# Patient Record
Sex: MALE | Race: WHITE | NOT HISPANIC OR LATINO | Employment: FULL TIME
[De-identification: names, ages, dates, MRNs, and addresses within clinical notes are randomized per-mention and may not be internally consistent; named-entity substitution may affect disease eponyms.]

---

## 2019-10-01 ENCOUNTER — HEALTH MAINTENANCE LETTER (OUTPATIENT)
Age: 54
End: 2019-10-01

## 2020-05-08 ENCOUNTER — TRANSFERRED RECORDS (OUTPATIENT)
Dept: HEALTH INFORMATION MANAGEMENT | Facility: CLINIC | Age: 55
End: 2020-05-08

## 2021-01-15 ENCOUNTER — HEALTH MAINTENANCE LETTER (OUTPATIENT)
Age: 56
End: 2021-01-15

## 2021-05-11 ENCOUNTER — TRANSFERRED RECORDS (OUTPATIENT)
Dept: HEALTH INFORMATION MANAGEMENT | Facility: CLINIC | Age: 56
End: 2021-05-11

## 2021-09-04 ENCOUNTER — HEALTH MAINTENANCE LETTER (OUTPATIENT)
Age: 56
End: 2021-09-04

## 2022-01-18 ENCOUNTER — MEDICAL CORRESPONDENCE (OUTPATIENT)
Dept: HEALTH INFORMATION MANAGEMENT | Facility: CLINIC | Age: 57
End: 2022-01-18

## 2022-02-19 ENCOUNTER — HEALTH MAINTENANCE LETTER (OUTPATIENT)
Age: 57
End: 2022-02-19

## 2022-10-16 ENCOUNTER — HEALTH MAINTENANCE LETTER (OUTPATIENT)
Age: 57
End: 2022-10-16

## 2022-12-06 ENCOUNTER — LAB REQUISITION (OUTPATIENT)
Dept: LAB | Facility: CLINIC | Age: 57
End: 2022-12-06
Payer: COMMERCIAL

## 2022-12-06 ENCOUNTER — TRANSFERRED RECORDS (OUTPATIENT)
Dept: HEALTH INFORMATION MANAGEMENT | Facility: CLINIC | Age: 57
End: 2022-12-06

## 2022-12-06 DIAGNOSIS — Z79.899 OTHER LONG TERM (CURRENT) DRUG THERAPY: ICD-10-CM

## 2022-12-06 DIAGNOSIS — L70.0 ACNE VULGARIS: ICD-10-CM

## 2022-12-06 LAB
ALBUMIN SERPL-MCNC: 3.9 G/DL (ref 3.4–5)
ALP SERPL-CCNC: 61 U/L (ref 40–150)
ALT SERPL W P-5'-P-CCNC: 26 U/L (ref 0–70)
ANION GAP SERPL CALCULATED.3IONS-SCNC: 5 MMOL/L (ref 3–14)
AST SERPL W P-5'-P-CCNC: 17 U/L (ref 0–45)
BILIRUB SERPL-MCNC: 0.5 MG/DL (ref 0.2–1.3)
BUN SERPL-MCNC: 18 MG/DL (ref 7–30)
CALCIUM SERPL-MCNC: 9 MG/DL (ref 8.5–10.1)
CHLORIDE BLD-SCNC: 104 MMOL/L (ref 94–109)
CO2 SERPL-SCNC: 26 MMOL/L (ref 20–32)
CREAT SERPL-MCNC: 0.84 MG/DL (ref 0.66–1.25)
ERYTHROCYTE [DISTWIDTH] IN BLOOD BY AUTOMATED COUNT: 12.6 % (ref 10–15)
GFR SERPL CREATININE-BSD FRML MDRD: >90 ML/MIN/1.73M2
GLUCOSE BLD-MCNC: 94 MG/DL (ref 70–99)
HCT VFR BLD AUTO: 46.3 % (ref 40–53)
HGB BLD-MCNC: 15.2 G/DL (ref 13.3–17.7)
MCH RBC QN AUTO: 30.6 PG (ref 26.5–33)
MCHC RBC AUTO-ENTMCNC: 32.8 G/DL (ref 31.5–36.5)
MCV RBC AUTO: 93 FL (ref 78–100)
PLATELET # BLD AUTO: 239 10E3/UL (ref 150–450)
POTASSIUM BLD-SCNC: 4.5 MMOL/L (ref 3.4–5.3)
PROT SERPL-MCNC: 7.4 G/DL (ref 6.8–8.8)
RBC # BLD AUTO: 4.96 10E6/UL (ref 4.4–5.9)
SODIUM SERPL-SCNC: 135 MMOL/L (ref 133–144)
WBC # BLD AUTO: 6.3 10E3/UL (ref 4–11)

## 2022-12-06 PROCEDURE — 85027 COMPLETE CBC AUTOMATED: CPT | Mod: ORL | Performed by: DERMATOLOGY

## 2022-12-06 PROCEDURE — 80053 COMPREHEN METABOLIC PANEL: CPT | Mod: ORL | Performed by: DERMATOLOGY

## 2023-01-04 ENCOUNTER — MEDICAL CORRESPONDENCE (OUTPATIENT)
Dept: HEALTH INFORMATION MANAGEMENT | Facility: CLINIC | Age: 58
End: 2023-01-04

## 2023-01-05 ENCOUNTER — TRANSCRIBE ORDERS (OUTPATIENT)
Dept: OTHER | Age: 58
End: 2023-01-05

## 2023-01-05 DIAGNOSIS — L98.6 OTHER INFILTRATIVE DISORDERS OF THE SKIN AND SUBCUTANEOUS TISSUE: ICD-10-CM

## 2023-01-05 DIAGNOSIS — D48.5 NEOPLASM OF UNCERTAIN BEHAVIOR OF SKIN: Primary | ICD-10-CM

## 2023-02-09 ENCOUNTER — TRANSCRIBE ORDERS (OUTPATIENT)
Dept: OTHER | Age: 58
End: 2023-02-09

## 2023-02-09 DIAGNOSIS — D48.5 NEOPLASM OF UNCERTAIN BEHAVIOR OF SKIN: Primary | ICD-10-CM

## 2023-02-09 DIAGNOSIS — L98.6 OTHER INFILTRATIVE DISORDERS OF THE SKIN AND SUBCUTANEOUS TISSUE: ICD-10-CM

## 2023-02-09 DIAGNOSIS — L98.8 PSEUDOLYMPHOMA: ICD-10-CM

## 2023-02-09 DIAGNOSIS — C85.80 MARGINAL ZONE B-CELL LYMPHOMA (H): ICD-10-CM

## 2023-02-09 DIAGNOSIS — J34.89 NASAL LESION: ICD-10-CM

## 2023-02-10 ENCOUNTER — TELEPHONE (OUTPATIENT)
Dept: DERMATOLOGY | Facility: CLINIC | Age: 58
End: 2023-02-10
Payer: COMMERCIAL

## 2023-02-10 NOTE — TELEPHONE ENCOUNTER
This encounter is being sent to inform the clinic that this patient has a referral from Dr. Magy Bernabe MD for the diagnoses of Neoplasm of uncertain behavior of skin; Other infiltrative disorders of the skin and subcutaneous tissue; Pseudolymphoma; Marginal zone B-cell lymphoma (H); Nasal lesion; Neoplasm of uncertain behavior of skin, Other infiltrative disorders of the skin and subcutaneous tissue, with h/o pseudolymphomia eval/with Dr. Mosqueda found to be neg for marginal zone lymphona B cell. Well controlled on plaquenil. New nasal lesion. and has requested that this patient be seen within Priority: 1-2 Weeks and/or with Priority: 1-2 Weeks.  Based on the availability of our provider(s), we are unable to accommodate this request.      Were all sites offered this patient?  Offer all available sites  Pt referred specifically to Dr Phyllis Vance    Does scheduling algorithm request to schedule next available?  Patient has been scheduled for the first available opening with Dr Vance on 06/13/2023.  We have informed the patient that the clinic will review their referral and reach out if a sooner appointment is medically necessary.      Pt was scheduled on 01/06/23 for 06/13/23 under Auto Immune     Now an Urgent Referral Order was sent over    Referral Order and Records in [facility]    Records also with Referring Clinic :  Dr. Magy Bernabe MD  [address], [address]  [place] [address]  Phone: [phone]  Fax: [phone]    Please review and call Pt to move up his Appt    Thank you very much!

## 2023-03-02 NOTE — TELEPHONE ENCOUNTER
Writer called patient and scheduled with a different provider who is available tomorrow 3.4.23. Patient acknowledged and writer canceled June appointment.    Mere CAREY RN

## 2023-03-02 NOTE — TELEPHONE ENCOUNTER
Dermatology Specialist calling back to ask that the patient be called for a sooner appointment since they sent over a priority referral.

## 2023-03-03 ENCOUNTER — LAB (OUTPATIENT)
Dept: LAB | Facility: CLINIC | Age: 58
End: 2023-03-03
Payer: COMMERCIAL

## 2023-03-03 ENCOUNTER — OFFICE VISIT (OUTPATIENT)
Dept: DERMATOLOGY | Facility: CLINIC | Age: 58
End: 2023-03-03
Payer: COMMERCIAL

## 2023-03-03 ENCOUNTER — PRE VISIT (OUTPATIENT)
Dept: DERMATOLOGY | Facility: CLINIC | Age: 58
End: 2023-03-03

## 2023-03-03 DIAGNOSIS — D48.5 NEOPLASM OF UNCERTAIN BEHAVIOR OF SKIN: ICD-10-CM

## 2023-03-03 DIAGNOSIS — L98.6 OTHER INFILTRATIVE DISORDERS OF THE SKIN AND SUBCUTANEOUS TISSUE: ICD-10-CM

## 2023-03-03 DIAGNOSIS — L98.8 PSEUDOLYMPHOMA: ICD-10-CM

## 2023-03-03 DIAGNOSIS — C85.80 MARGINAL ZONE B-CELL LYMPHOMA (H): ICD-10-CM

## 2023-03-03 DIAGNOSIS — J34.89 NASAL LESION: ICD-10-CM

## 2023-03-03 LAB
ALBUMIN SERPL BCG-MCNC: 4.4 G/DL (ref 3.5–5.2)
ALP SERPL-CCNC: 55 U/L (ref 40–129)
ALT SERPL W P-5'-P-CCNC: 14 U/L (ref 10–50)
ANION GAP SERPL CALCULATED.3IONS-SCNC: 8 MMOL/L (ref 7–15)
AST SERPL W P-5'-P-CCNC: 17 U/L (ref 10–50)
BASOPHILS # BLD AUTO: 0 10E3/UL (ref 0–0.2)
BASOPHILS NFR BLD AUTO: 1 %
BILIRUB SERPL-MCNC: 0.5 MG/DL
BUN SERPL-MCNC: 20.7 MG/DL (ref 8–23)
CALCIUM SERPL-MCNC: 9.5 MG/DL (ref 8.6–10)
CHLORIDE SERPL-SCNC: 102 MMOL/L (ref 98–107)
CREAT SERPL-MCNC: 0.81 MG/DL (ref 0.67–1.17)
DEPRECATED HCO3 PLAS-SCNC: 28 MMOL/L (ref 22–29)
EOSINOPHIL # BLD AUTO: 0.1 10E3/UL (ref 0–0.7)
EOSINOPHIL NFR BLD AUTO: 2 %
ERYTHROCYTE [DISTWIDTH] IN BLOOD BY AUTOMATED COUNT: 12.4 % (ref 10–15)
GFR SERPL CREATININE-BSD FRML MDRD: >90 ML/MIN/1.73M2
GLUCOSE SERPL-MCNC: 95 MG/DL (ref 70–99)
HCT VFR BLD AUTO: 43.2 % (ref 40–53)
HGB BLD-MCNC: 14.6 G/DL (ref 13.3–17.7)
IMM GRANULOCYTES # BLD: 0 10E3/UL
IMM GRANULOCYTES NFR BLD: 0 %
LDH SERPL L TO P-CCNC: 177 U/L (ref 0–250)
LYMPHOCYTES # BLD AUTO: 1.3 10E3/UL (ref 0.8–5.3)
LYMPHOCYTES NFR BLD AUTO: 25 %
MCH RBC QN AUTO: 31.3 PG (ref 26.5–33)
MCHC RBC AUTO-ENTMCNC: 33.8 G/DL (ref 31.5–36.5)
MCV RBC AUTO: 93 FL (ref 78–100)
MONOCYTES # BLD AUTO: 0.4 10E3/UL (ref 0–1.3)
MONOCYTES NFR BLD AUTO: 9 %
NEUTROPHILS # BLD AUTO: 3.3 10E3/UL (ref 1.6–8.3)
NEUTROPHILS NFR BLD AUTO: 63 %
NRBC # BLD AUTO: 0 10E3/UL
NRBC BLD AUTO-RTO: 0 /100
PLATELET # BLD AUTO: 223 10E3/UL (ref 150–450)
POTASSIUM SERPL-SCNC: 4.4 MMOL/L (ref 3.4–5.3)
PROT SERPL-MCNC: 7.1 G/DL (ref 6.4–8.3)
RBC # BLD AUTO: 4.67 10E6/UL (ref 4.4–5.9)
RETICS # AUTO: 0.03 10E6/UL (ref 0.03–0.1)
RETICS/RBC NFR AUTO: 0.6 % (ref 0.5–2)
SODIUM SERPL-SCNC: 138 MMOL/L (ref 136–145)
WBC # BLD AUTO: 5.2 10E3/UL (ref 4–11)

## 2023-03-03 PROCEDURE — 88184 FLOWCYTOMETRY/ TC 1 MARKER: CPT | Performed by: DERMATOLOGY

## 2023-03-03 PROCEDURE — 83615 LACTATE (LD) (LDH) ENZYME: CPT | Performed by: PATHOLOGY

## 2023-03-03 PROCEDURE — 85060 BLOOD SMEAR INTERPRETATION: CPT | Performed by: PATHOLOGY

## 2023-03-03 PROCEDURE — 80053 COMPREHEN METABOLIC PANEL: CPT | Performed by: PATHOLOGY

## 2023-03-03 PROCEDURE — 85025 COMPLETE CBC W/AUTO DIFF WBC: CPT | Performed by: PATHOLOGY

## 2023-03-03 PROCEDURE — 88189 FLOWCYTOMETRY/READ 16 & >: CPT | Mod: GC | Performed by: PATHOLOGY

## 2023-03-03 PROCEDURE — 36415 COLL VENOUS BLD VENIPUNCTURE: CPT | Performed by: PATHOLOGY

## 2023-03-03 PROCEDURE — 85045 AUTOMATED RETICULOCYTE COUNT: CPT | Performed by: PATHOLOGY

## 2023-03-03 PROCEDURE — 99000 SPECIMEN HANDLING OFFICE-LAB: CPT | Performed by: PATHOLOGY

## 2023-03-03 PROCEDURE — 88184 FLOWCYTOMETRY/ TC 1 MARKER: CPT | Performed by: PATHOLOGY

## 2023-03-03 PROCEDURE — 99204 OFFICE O/P NEW MOD 45 MIN: CPT | Mod: GC | Performed by: DERMATOLOGY

## 2023-03-03 RX ORDER — FLUTICASONE PROPIONATE 0.05 MG/G
OINTMENT TOPICAL
COMMUNITY

## 2023-03-03 RX ORDER — TACROLIMUS 1 MG/G
OINTMENT TOPICAL
COMMUNITY
End: 2023-09-15 | Stop reason: ALTCHOICE

## 2023-03-03 RX ORDER — HYDROXYCHLOROQUINE SULFATE 200 MG/1
1 TABLET, FILM COATED ORAL
COMMUNITY
Start: 2023-01-16 | End: 2023-09-15 | Stop reason: ALTCHOICE

## 2023-03-03 ASSESSMENT — PAIN SCALES - GENERAL: PAINLEVEL: NO PAIN (0)

## 2023-03-03 NOTE — NURSING NOTE
Dermatology Rooming Note    Bob Miranda's goals for this visit include:   Chief Complaint   Patient presents with     Derm Problem     Bob is here today for a lesion of concern on the left side of the nose      Jannet CHAVEZ, MILEY

## 2023-03-03 NOTE — LETTER
3/3/2023       RE: Bob Miranda  [address] [place] [address]     Dear Colleague,    Thank you for referring your patient, Bob Miranda, to the [facility] DERMATOLOGY CLINIC [place] [facility]. Please see a copy of my visit note below.     [facility] Dermatology Note  Encounter Date: Mar 3, 2023  Office Visit     Dermatology Problem List:  1. 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 Bcl-2 positivity potentially suggesting low grade marginal zone lymphoma; by review, historically with Bcl-2 positivity (exluding 2012 biopsy) which could suggest chronicity of diagnosis. Thankfulyl, indolent course of disease (which additionally supports a cutaneous limited disease). Prior PET CT negative 2016 (5 years following onset of disease). Holding on further imaging / oncology involvement pending labs and in-house dermatopathology review.  - Labs today: CBC, CMP, peripheral smear, flow cytometry, LDH  - Outside slide request placed and message sent to histology  - Agree with continued hydroxychloroquine use for now; may discontinue pending initial eval  - Due for annual eye exam - enforced need for exam  - Discontinue tacrolimus given potential for flaring cutaneous lymphoma  - Continue prn topical steroid  - Future considerations for recurrences: IL triamcinolone, XRT (pending location)     Procedures Performed:   None    Follow-up: pending path results    Staff and  Resident:     This patient was staffed with Dr. Aguiar.    Lexx Lynch MD  Internal Medicine - Dermatology PGY5    Staff Physician Comments:   I saw and evaluated the patient with the resident and I edited the assessment and plan as documented in the note. I was present for the examination.    Colten Aguiar MD   of Dermatology  Department of Dermatology  [facility]      ____________________________________________    CC: Derm Problem (Bob is here today for a lesion of concern on the left side of the nose )    HPI:  Mr. Bob Miranda is a(n) 57 year old male who presents today as a new patient for CBCL.  Patient notes greater than 12-year history of intermittent nonhealing lesions on the head and neck predominantly.  First lesion on left forehead.  Biopsy showed potential pseudolymphoma.  Has been managed with multiple different modalities including surgical excision, intralesional Kenalog, narrowband UVB, topical steroids, topical calcineurin inhibitors.  Has had 1 lesion on the upper arm, remainder on the forehead, scalp, nasal tip and most recently right nasal ala.      Numerous biopsies have been performed.  Varying reports of lymphoid infiltrates, low-grade B-cell lymphoproliferative disorders, pseudolymphoma.  Completed PET/CT 2016 which was unremarkable.  Appears historically to have Bcl-2 expression (with exception of 2012 biopsy).  Overall, patient has been relatively well controlled on Plaquenil.  Was tapered through early 2022.  Unfortunately had recurrence this fall.  Biopsy at that time performed and potentially suggestive of marginal zone lymphoma.  Patient was referred for further evaluation.      Today, patient confirms the above history.  He denies any B symptoms.  Has been intentionally losing weight over the last several months on weight watchers.  This is a gradual weight loss.  Has never had historic unintentional weight loss.   Denies any fevers, chills, night sweats, adenopathy.  He is overall in good health.  He is currently managing with Protopic and topical steroid as well as Plaquenil.    Labs Reviewed:  CBC, CMP 12/2022 unremarkable  PET CT 2016 - no e/o lymphoma  Histology as below reviewed    Biopsy Hx:  2022 2021 2020 2016 2016 2014 2012      Physical Exam:  GEN: Pleasant male, in NAD  SKIN: Focused examination of head and neck was performed.  -Prior biopsy site and right ala in addition to multiple other scattered biopsy sites are well-healing with atrophic appearing scar, but no evidence of lymphoma.   -No other lesions of concern on areas examined.     Medications:  Current Outpatient Medications   Medication     fluticasone propionate (CUTIVATE) 0.005 % external ointment     hydroxychloroquine (PLAQUENIL) 200 MG tablet     tacrolimus (PROTOPIC) 0.1 % external ointment     No current facility-administered medications for this visit.      Past Medical History:   There is no problem list on file for this patient.    No past medical history on file.    CC Magy Bernabe  [facility]  [address] [facility] [address]  [place] [address] on close of this encounter.

## 2023-03-03 NOTE — PROGRESS NOTES
[facility] Dermatology Note  Encounter Date: Mar 3, 2023  Office Visit     Dermatology Problem List:  1. 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 Bcl-2 positivity potentially suggesting low grade marginal zone lymphoma; by review, historically with Bcl-2 positivity (exluding 2012 biopsy) which could suggest chronicity of diagnosis. Thankfulyl, indolent course of disease (which additionally supports a cutaneous limited disease). Prior PET CT negative 2016 (5 years following onset of disease). Holding on further imaging / oncology involvement pending labs and in-house dermatopathology review.  - Labs today: CBC, CMP, peripheral smear, flow cytometry, LDH  - Outside slide request placed and message sent to histology  - Agree with continued hydroxychloroquine use for now; may discontinue pending initial eval  - Due for annual eye exam - enforced need for exam  - Discontinue tacrolimus given potential for flaring cutaneous lymphoma  - Continue prn topical steroid  - Future considerations for recurrences: IL triamcinolone, XRT (pending location)     Procedures Performed:   None    Follow-up: pending path results    Staff and Resident:     This patient was staffed with Dr. Aguiar.    Lexx Lynch MD  Internal Medicine - Dermatology PGY5    Staff Physician Comments:   I saw and evaluated the patient with the resident and I edited the assessment and plan as documented in the note. I was present for the examination.    Colten Aguiar,  MD   of Dermatology  Department of Dermatology  [facility]      ____________________________________________    CC: Derm Problem (Bob is here today for a lesion of concern on the left side of the nose )    HPI:  Mr. Bob Miranda is a(n) 57 year old male who presents today as a new patient for CBCL.  Patient notes greater than 12-year history of intermittent nonhealing lesions on the head and neck predominantly.  First lesion on left forehead.  Biopsy showed potential pseudolymphoma.  Has been managed with multiple different modalities including surgical excision, intralesional Kenalog, narrowband UVB, topical steroids, topical calcineurin inhibitors.  Has had 1 lesion on the upper arm, remainder on the forehead, scalp, nasal tip and most recently right nasal ala.      Numerous biopsies have been performed.  Varying reports of lymphoid infiltrates, low-grade B-cell lymphoproliferative disorders, pseudolymphoma.  Completed PET/CT 2016 which was unremarkable.  Appears historically to have Bcl-2 expression (with exception of 2012 biopsy).  Overall, patient has been relatively well controlled on Plaquenil.  Was tapered through early 2022.  Unfortunately had recurrence this fall.  Biopsy at that time performed and potentially suggestive of marginal zone lymphoma.  Patient was referred for further evaluation.      Today, patient confirms the above history.  He denies any B symptoms.  Has been intentionally losing weight over the last several months on weight watchers.  This is a gradual weight loss.  Has never had historic unintentional weight loss.  Denies any fevers, chills, night sweats, adenopathy.  He is overall in good health.  He is currently managing with Protopic and topical steroid as well as Plaquenil.    Labs Reviewed:  CBC, CMP 12/2022 unremarkable  PET CT 2016 - no e/o lymphoma  Histology as below reviewed    Biopsy  Hx:  2022 2021 2020 2016 2016 2014 2012      Physical Exam:  GEN: Pleasant male, in NAD  SKIN: Focused examination of head and neck was performed.  -Prior biopsy site and right ala in addition to multiple other scattered biopsy sites are well-healing with atrophic appearing scar, but no evidence of lymphoma.   -No other lesions of concern on areas examined.     Medications:  Current Outpatient Medications   Medication     fluticasone propionate (CUTIVATE) 0.005 % external ointment     hydroxychloroquine (PLAQUENIL) 200 MG tablet     tacrolimus (PROTOPIC) 0.1 % external ointment     No current facility-administered medications for this visit.      Past Medical History:   There is no problem list on file for this patient.    No past medical history on file.    CC Magy Bernabe  [facility]  [address]  [place] [address] on close of this encounter.

## 2023-03-03 NOTE — TELEPHONE ENCOUNTER
FUTURE VISIT INFORMATION      FUTURE VISIT INFORMATION:    Date: 3.3.23    Time: 10:00    Location: [facility]  REFERRAL INFORMATION:    Referring provider:  Srinivasa    Referring providers clinic:  [facility]    Reason for visit/diagnosis  Neoplasm of uncertain behavior of skin     Other infiltrative disorders of the skin and subcutaneous tissue     Pt with h/o pseudolymphoma eval/ with Dr Mosqueda found to be neg for marginal zone lymphoma B cell. Well controlled on plaquenil. New nasal lesion 11-22 bx c/w marginal zone B cell lymphoma.    RECORDS REQUESTED FROM:       Clinic name Comments Records Status Imaging Status   [facility] 12.6.22  Srinivasa Received  Sent to scanning  (Also put copy in Dr. Mueller Med Records tab) Received  Sent to scanning (Also put copy in Dr. Mueller Med Records tab)

## 2023-03-06 LAB
PATH REPORT.COMMENTS IMP SPEC: NORMAL
PATH REPORT.FINAL DX SPEC: NORMAL
PATH REPORT.FINAL DX SPEC: NORMAL
PATH REPORT.MICROSCOPIC SPEC OTHER STN: NORMAL
PATH REPORT.RELEVANT HX SPEC: NORMAL
PATH REPORT.RELEVANT HX SPEC: NORMAL

## 2023-04-01 ENCOUNTER — HEALTH MAINTENANCE LETTER (OUTPATIENT)
Age: 58
End: 2023-04-01

## 2023-09-15 ENCOUNTER — OFFICE VISIT (OUTPATIENT)
Dept: DERMATOLOGY | Facility: CLINIC | Age: 58
End: 2023-09-15
Payer: COMMERCIAL

## 2023-09-15 DIAGNOSIS — C85.80 MARGINAL ZONE B-CELL LYMPHOMA (H): Primary | ICD-10-CM

## 2023-09-15 PROCEDURE — 11900 INJECT SKIN LESIONS </W 7: CPT | Performed by: DERMATOLOGY

## 2023-09-15 PROCEDURE — 99213 OFFICE O/P EST LOW 20 MIN: CPT | Mod: 25 | Performed by: DERMATOLOGY

## 2023-09-15 ASSESSMENT — PAIN SCALES - GENERAL: PAINLEVEL: NO PAIN (0)

## 2023-09-15 NOTE — PROGRESS NOTES
[facility] Dermatology Note    Encounter Date: Sep 15, 2023    Dermatology Problem List:  1. 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 Primary cutaneous marginal zone B cell lymphoma: lesion biopsied recently with scarring on the nose, now with brown-pink papule along superior margin suspected to represent recurrence. We discussed Mohs vs intralesional triamcinolone; favor the latter but low threshold for the former if does not resolve. Mohs may also help from scarring perspective given degree of scarring from outside biopsy.  - After positioning and cleansing with isopropyl alcohol, 0.2 total mL of triamcinolone 10 mg/mL was injected into 1 lesion(s) on the right nasal ala. The patient tolerated the procedure well and left the dermatology clinic in good condition.  - will follow up on outside path consult; not yet read      Follow-up in 3 months.       Staff Involved:  Staff Only    Colten Aguiar MD   of Dermatology  Department of Dermatology  [facility]      CC:   Chief Complaint   Patient presents with    Skin Check     Bob is here today for a recheck of a lesion on his nose        History of Present Illness:  Mr. Bob Miranda is a 58 year old male who presents as a return patient.    Overall doing well - no new symptoms  - scarring is concerning    Labs:  N/A    Physical exam:  Vitals: There were no vitals taken for this visit.  GEN: This is a well developed, well-nourished male in no acute distress, in a pleasant mood.    SKIN: Almendarez phototype II  - Focused examination of  the face was performed.  - scar on the right nasal ala with brown-pink papule along superior margin  - erythema on the left cheek  - No other lesions of concern on areas examined.     Past Medical History:   No past medical history on file.  No past surgical history on file.    Social History:   reports that he has never smoked. He has never used smokeless tobacco.    Family History:  No family history on file.    Medications:  Current Outpatient Medications   Medication Sig Dispense Refill    hydroxychloroquine (PLAQUENIL) 200 MG tablet Take 1 tablet by mouth 2 times daily      fluticasone propionate (CUTIVATE) 0.005 % external ointment  (Patient not taking: Reported on 9/15/2023)      tacrolimus (PROTOPIC) 0.1 % external ointment        Allergies   Allergen Reactions    Povidone Iodine Rash

## 2023-09-15 NOTE — LETTER
9/15/2023       RE: Bob Miranda  [address] [place] [address]     Dear Colleague,    Thank you for referring your patient, Bob Miranda, to the [facility] DERMATOLOGY CLINIC [place] at [facility]. Please see a copy of my visit note below.    [facility] Dermatology Note    Encounter Date: Sep 15, 2023    Dermatology Problem List:  1. 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 Primary cutaneous marginal zone B cell lymphoma: lesion biopsied recently with scarring on the nose, now with brown-pink papule along superior margin suspected to represent recurrence. We discussed Mohs vs intralesional triamcinolone; favor the latter but low threshold for the former if does not resolve. Mohs may also help from scarring perspective given degree of scarring from outside biopsy.  - After positioning and cleansing with isopropyl alcohol, 0.2 total mL of triamcinolone 10 mg/mL was injected into 1 lesion(s) on the right nasal ala. The patient tolerated the procedure well and left the dermatology clinic in good condition.  - will follow up on outside path consult; not yet read      Follow-up in 3 months.       Staff Involved:  Staff Only    Colten Aguiar MD   of Dermatology  Department of Dermatology  [facility]      CC:   Chief Complaint   Patient presents with    Skin Check     Bob is here today for a recheck of a lesion on his nose        History of Present Illness:  Mr. Bob Miranda is a 58 year old male who presents as  a return patient.    Overall doing well - no new symptoms  - scarring is concerning    Labs:  N/A    Physical exam:  Vitals: There were no vitals taken for this visit.  GEN: This is a well developed, well-nourished male in no acute distress, in a pleasant mood.    SKIN: Almendarez phototype II  - Focused examination of the face was performed.  - scar on the right nasal ala with brown-pink papule along superior margin  - erythema on the left cheek  - No other lesions of concern on areas examined.     Past Medical History:   No past medical history on file.  No past surgical history on file.    Social History:   reports that he has never smoked. He has never used smokeless tobacco.    Family History:  No family history on file.    Medications:  Current Outpatient Medications   Medication Sig Dispense Refill    hydroxychloroquine (PLAQUENIL) 200 MG tablet Take 1 tablet by mouth 2 times daily      fluticasone propionate (CUTIVATE) 0.005 % external ointment  (Patient not taking: Reported on 9/15/2023)      tacrolimus (PROTOPIC) 0.1 % external ointment        Allergies   Allergen Reactions    Povidone Iodine Rash

## 2023-09-15 NOTE — NURSING NOTE
Drug Administration Record    Prior to injection, verified patient identity using patient's name and date of birth.  Due to injection administration, patient instructed to remain in clinic for 15 minutes  afterwards, and to report any adverse reaction to me immediately.    Drug Name: triamcinolone acetonide(kenalog)  Dose: 0.2mL of triamcinolone 10mg/mL, 2mg dose  Route administered: ID  NDC #: Kenalog-10 (7577-0109-18)  Amount of waste(mL):4.8ml  Reason for waste: Multi dose vial    LOT #: [id number]  SITE: see provider note  : [employer]  EXPIRATION DATE: 10/2025

## 2023-09-15 NOTE — NURSING NOTE
Dermatology Rooming Note    Bob Miranda's goals for this visit include:   Chief Complaint   Patient presents with    Skin Check     Bob is here today for a recheck of a lesion on his nose      Jannet CHAVEZ CMA

## 2024-04-11 ENCOUNTER — LAB (OUTPATIENT)
Dept: LAB | Facility: CLINIC | Age: 59
End: 2024-04-11
Payer: COMMERCIAL

## 2024-04-11 ENCOUNTER — OFFICE VISIT (OUTPATIENT)
Dept: DERMATOLOGY | Facility: CLINIC | Age: 59
End: 2024-04-11
Payer: COMMERCIAL

## 2024-04-11 DIAGNOSIS — C88.40 PRIMARY CUTANEOUS MARGINAL ZONE B-CELL LYMPHOMA: Primary | ICD-10-CM

## 2024-04-11 DIAGNOSIS — C88.40 PRIMARY CUTANEOUS MARGINAL ZONE B-CELL LYMPHOMA: ICD-10-CM

## 2024-04-11 LAB
ALBUMIN SERPL BCG-MCNC: 4.5 G/DL (ref 3.5–5.2)
ALP SERPL-CCNC: 59 U/L (ref 40–150)
ALT SERPL W P-5'-P-CCNC: 16 U/L (ref 0–70)
ANION GAP SERPL CALCULATED.3IONS-SCNC: 12 MMOL/L (ref 7–15)
AST SERPL W P-5'-P-CCNC: 16 U/L (ref 0–45)
BASOPHILS # BLD AUTO: 0.1 10E3/UL (ref 0–0.2)
BASOPHILS NFR BLD AUTO: 1 %
BILIRUB SERPL-MCNC: 0.5 MG/DL
BUN SERPL-MCNC: 23.1 MG/DL (ref 8–23)
CALCIUM SERPL-MCNC: 9 MG/DL (ref 8.8–10.2)
CHLORIDE SERPL-SCNC: 105 MMOL/L (ref 98–107)
CREAT SERPL-MCNC: 0.77 MG/DL (ref 0.67–1.17)
DEPRECATED HCO3 PLAS-SCNC: 22 MMOL/L (ref 22–29)
EGFRCR SERPLBLD CKD-EPI 2021: >90 ML/MIN/1.73M2
EOSINOPHIL # BLD AUTO: 0.3 10E3/UL (ref 0–0.7)
EOSINOPHIL NFR BLD AUTO: 4 %
ERYTHROCYTE [DISTWIDTH] IN BLOOD BY AUTOMATED COUNT: 12.9 % (ref 10–15)
GLUCOSE SERPL-MCNC: 98 MG/DL (ref 70–99)
HCT VFR BLD AUTO: 44.5 % (ref 40–53)
HGB BLD-MCNC: 15.4 G/DL (ref 13.3–17.7)
IMM GRANULOCYTES # BLD: 0.1 10E3/UL
IMM GRANULOCYTES NFR BLD: 1 %
LDH SERPL L TO P-CCNC: 181 U/L (ref 0–250)
LYMPHOCYTES # BLD AUTO: 1.8 10E3/UL (ref 0.8–5.3)
LYMPHOCYTES NFR BLD AUTO: 25 %
MCH RBC QN AUTO: 31.6 PG (ref 26.5–33)
MCHC RBC AUTO-ENTMCNC: 34.6 G/DL (ref 31.5–36.5)
MCV RBC AUTO: 91 FL (ref 78–100)
MONOCYTES # BLD AUTO: 0.6 10E3/UL (ref 0–1.3)
MONOCYTES NFR BLD AUTO: 8 %
NEUTROPHILS # BLD AUTO: 4.4 10E3/UL (ref 1.6–8.3)
NEUTROPHILS NFR BLD AUTO: 61 %
NRBC # BLD AUTO: 0 10E3/UL
NRBC BLD AUTO-RTO: 0 /100
PLATELET # BLD AUTO: 274 10E3/UL (ref 150–450)
POTASSIUM SERPL-SCNC: 4.3 MMOL/L (ref 3.4–5.3)
PROT SERPL-MCNC: 7.5 G/DL (ref 6.4–8.3)
RBC # BLD AUTO: 4.87 10E6/UL (ref 4.4–5.9)
SODIUM SERPL-SCNC: 139 MMOL/L (ref 135–145)
WBC # BLD AUTO: 7.2 10E3/UL (ref 4–11)

## 2024-04-11 PROCEDURE — 36415 COLL VENOUS BLD VENIPUNCTURE: CPT | Performed by: PATHOLOGY

## 2024-04-11 PROCEDURE — 88184 FLOWCYTOMETRY/ TC 1 MARKER: CPT | Performed by: DERMATOLOGY

## 2024-04-11 PROCEDURE — 83615 LACTATE (LD) (LDH) ENZYME: CPT | Performed by: PATHOLOGY

## 2024-04-11 PROCEDURE — 99214 OFFICE O/P EST MOD 30 MIN: CPT | Performed by: DERMATOLOGY

## 2024-04-11 PROCEDURE — 85025 COMPLETE CBC W/AUTO DIFF WBC: CPT | Performed by: PATHOLOGY

## 2024-04-11 PROCEDURE — 80053 COMPREHEN METABOLIC PANEL: CPT | Performed by: PATHOLOGY

## 2024-04-11 PROCEDURE — 88188 FLOWCYTOMETRY/READ 9-15: CPT | Mod: GC | Performed by: PATHOLOGY

## 2024-04-11 PROCEDURE — 88184 FLOWCYTOMETRY/ TC 1 MARKER: CPT | Performed by: PATHOLOGY

## 2024-04-11 PROCEDURE — 88185 FLOWCYTOMETRY/TC ADD-ON: CPT | Performed by: DERMATOLOGY

## 2024-04-11 ASSESSMENT — PAIN SCALES - GENERAL: PAINLEVEL: NO PAIN (0)

## 2024-04-11 NOTE — PROGRESS NOTES
[facility] Dermatology Note  Encounter Date: Apr 11, 2024  Office Visit     Dermatology Problem List:  1. 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 Primary cutaneous marginal zone B cell lymphoma  Overall, no concerning signs for recurrence, with only minimal scar tissue. Lesion with scarring on the nose that was previously biopsied has now healed with mild atrophy, and patient declines any further treatment, including Mohs vs additional intralesional triamcinolone injection. Slides previously requested for pathology consult were never received. At this time, with no signs of disease recurrence, will continue to monitor with labs. Discussed decreasing surveillance to annually and additional testing if there is recurrence. Patient is amenable.   - Ordered labs today: flow cytometry, CMP, lactate dehydrogenase, CBC  - Recommend he massage scar after applying Vaseline. Encouraged regular moisturization.    Procedures Performed:   N/A    Follow-up: 1 year(s) in-person, or earlier for new or changing lesions    Staff and Scribe:     Scribe Disclosure:   I, Chanell To, am serving as a scribe to document services personally performed by Colten Aguiar MD based on data collection and the provider's statements to me.     Provider Disclosure:   The documentation recorded by the scribe accurately reflects the services I personally performed and the decisions made by me.    Colten Aguiar MD   of Dermatology  Department of Dermatology  [facility]    ____________________________________________    CC: Derm Problem (CBCL - Rojas states  he has been stable and there has been a decrease in redness )    HPI:  Mr. Bob Miranda is a(n) 58 year old male who presents today for follow-up  for primary cutaneous marginal zone B cell lymphoma    Overall, he is feeling well. His nose has mostly healed, is not bothersome, and is less red. He would like to hold off on any further treatment of the area. Possible sun damage under left eye has healed. Denies fevers, chills, night sweats. Has been off hydroxychloroquine since 9/2023. Has fluticasone propionate 0.005% ointment sitting in a drawer and has not had to use it in some time.     Patient is otherwise feeling well, without additional skin concerns.    Labs Reviewed:  No new labs since 3/20/23    Physical Exam:  Vitals: There were no vitals taken for this visit.  SKIN: Focused examination of face was performed.  - Almendarez Skin Phototype: III  - Pink atrophic scar on the right nasal ala.  - Erythematous patch on the left cheek.  - No other lesions of concern on areas examined.     Medications:  Current Outpatient Medications   Medication Sig Dispense Refill    fluticasone propionate (CUTIVATE) 0.005 % external ointment  (Patient not taking: Reported on 9/15/2023)       No current facility-administered medications for this visit.      Past Medical History:   There is no problem list on file for this patient.    No past medical history on file.     CC Magy Bernabe  [facility]  [address] [facility] [address]  [place] [address] on close of this encounter.

## 2024-04-11 NOTE — LETTER
4/11/2024       RE: Bob Miranda  [address] [place] [address]     Dear Colleague,    Thank you for referring your patient, Bob Miranda, to the [facility] DERMATOLOGY CLINIC [place] [facility]. Please see a copy of my visit note below.    [facility] Dermatology Note  Encounter Date: Apr 11, 2024  Office Visit     Dermatology Problem List:  1. 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 Primary cutaneous marginal zone B cell lymphoma  Overall, no concerning signs for recurrence, with only minimal scar tissue. Lesion with scarring on the nose that was previously biopsied has now healed with mild atrophy, and patient declines any further treatment, including Mohs vs additional intralesional triamcinolone injection. Slides previously requested for pathology consult were never received. At this time, with no signs of disease recurrence, will continue to monitor with labs. Discussed decreasing surveillance to annually and additional testing if there is recurrence. Patient is amenable.   - Ordered labs today: flow cytometry, CMP, lactate dehydrogenase, CBC  - Recommend he massage scar after applying Vaseline. Encouraged regular moisturization.    Procedures Performed:   N/A    Follow-up: 1 year(s) in-person, or earlier for new or changing lesions    Staff and Scribe:     Scribe Disclosure:   I, Chanell To, am serving as a scribe to document services personally performed by Colten Aguiar MD based on data collection and the provider's statements to me.     Provider Disclosure:   The documentation recorded by  the scribe accurately reflects the services I personally performed and the decisions made by me.    Colten Aguiar MD   of Dermatology  Department of Dermatology  [facility] [employer] [facility] [employer]    ____________________________________________    CC: Derm Problem (CBCL - Bob states he has been stable and there has been a decrease in redness )    HPI:  Mr. Bob Miranda is a(n) 58 year old male who presents today for follow-up  for primary cutaneous marginal zone B cell lymphoma    Overall, he is feeling well. His nose has mostly healed, is not bothersome, and is less red. He would like to hold off on any further treatment of the area. Possible sun damage under left eye has healed. Denies fevers, chills, night sweats. Has been off hydroxychloroquine since 9/2023. Has fluticasone propionate 0.005% ointment sitting in a drawer and has not had to use it in some time.     Patient is otherwise feeling well, without additional skin concerns.    Labs Reviewed:  No new labs since 3/20/23    Physical Exam:  Vitals: There were no vitals taken for this visit.  SKIN: Focused examination of face was performed.  - Almendarez Skin Phototype: III  - Pink atrophic scar on the right nasal ala.  - Erythematous patch on the left cheek.  - No other lesions of concern on areas examined.     Medications:  Current Outpatient Medications   Medication Sig Dispense Refill    fluticasone propionate (CUTIVATE) 0.005 % external ointment  (Patient not taking: Reported on 9/15/2023)       No current facility-administered medications for this visit.      Past Medical History:   There is no problem list on file for this patient.    No past medical history on file.     CC Magy Bernabe  [facility]  [address] [address] on close of this encounter.

## 2024-04-11 NOTE — NURSING NOTE
Dermatology Rooming Note    Bob Miranda's goals for this visit include:   Chief Complaint   Patient presents with    Derm Problem     CBCL - Bob states he has been stable and there has been a decrease in redness      Jannet CHAVEZ CMA

## 2024-04-12 LAB
PATH REPORT.COMMENTS IMP SPEC: NORMAL
PATH REPORT.FINAL DX SPEC: NORMAL
PATH REPORT.MICROSCOPIC SPEC OTHER STN: NORMAL
PATH REPORT.RELEVANT HX SPEC: NORMAL

## 2024-06-01 ENCOUNTER — HEALTH MAINTENANCE LETTER (OUTPATIENT)
Age: 59
End: 2024-06-01

## 2025-06-14 ENCOUNTER — HEALTH MAINTENANCE LETTER (OUTPATIENT)
Age: 60
End: 2025-06-14

## 2025-07-02 NOTE — PROGRESS NOTES
[facility] Dermatology Note    Encounter Date: Jul 3, 2025    Dermatology Problem List:  1. 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 Primary cutaneous marginal zone B cell lymphoma with new lesion overlying the L clavicle and palpable L lateral cervical lymph node. Will biopsy L clavicular lesion and US the lymph node but suspect the latter is reactive (had dental work 6 months ago on the L lower jaw). Pending biopsy results, will start topical augmented betamethasone ointment. No other recurrence in previously affected areas.   - Ordered labs today: CBC, CMP, Flow cytometry, smear, LDH  - Punch biopsy performed today. See procedure section.  - US head/neck ordered today.   - Start augmented betamethasone ointment, pending biopsy results    Procedures:  Punch biopsy, L clavicle:  After discussion of benefits and risks including but not limited to bleeding/bruising, pain/swelling, infection, scar, incomplete removal, nerve damage/numbness, recurrence, and non-diagnostic biopsy,  verbal consent and photographs were obtained. Time-out was performed. The area was cleaned with isopropyl alcohol. 0.5mL of 1% lidocaine with epinephrine was injected to obtain adequate anesthesia of the lesion. A 4 mm punch biopsy was performed.  4-0 prolene sutures were utilized to approximate the epidermal edges.  White petroleum jelly/VaselineTM and a bandage was applied to the wound.  Explicit verbal and written wound care instructions were provided.  The patient left the Dermatology Clinic in good condition. The patient was counseled to follow up for suture removal in  approximately 10-14 days.      Follow-up in 1 year, pending biopsy results.       Staff Involved:  Staff/Scribe  Scribe Disclosure:   I, Mackenzie Gray, am serving as a scribe to document services personally performed by Colten Aguiar MD based on data collection and the provider's statements to me.     Provider Disclosure:   The documentation recorded by the scribe accurately reflects the services I personally performed and the decisions made by me.    Colten Aguiar MD   of Dermatology  Department of Dermatology  [facility]        CC:   Chief Complaint   Patient presents with    Derm Problem     CBCL-Skin is doing well. Area of concern on left collarbone.        History of Present Illness:  Mr. Bob Miranda is a 60 year old male who presents as a return patient.    Today, patient reports skin is overall doing well. Reports spot on the collarbone present for the last few months. Thought it was a bruise at first but it has never gone away. Denies fever, chills or weigh loss.     Labs:  04/2024 CBC, CMP, Flow cytometry, LDH reviewed    Physical exam:  Vitals: There were no vitals taken for this visit.  GEN: This is a well developed, well-nourished male in no acute distress, in a pleasant mood.    SKIN: Almendarez phototype III  - Focused examination of the face, neck, chest, abdomen, back, arms and hands was performed.  - Brightly erythematous thin plaque overlying the L clavicle  - Palpable lymph node in the L lateral cervical chain, non-fixed, non-firm.  - No other cervical supraclavicular or axillary lymphadenopathy  - No other lesions of concern on areas examined.     Past Medical History:   No past medical history on file.  No past surgical history on file.    Social History:   reports that he has never smoked. He has never used smokeless tobacco.    Family History:  No family history on file.    Medications:  No current outpatient medications on  file.     Allergies   Allergen Reactions    Povidone Iodine Rash

## 2025-07-03 ENCOUNTER — OFFICE VISIT (OUTPATIENT)
Dept: DERMATOLOGY | Facility: CLINIC | Age: 60
End: 2025-07-03
Payer: COMMERCIAL

## 2025-07-03 ENCOUNTER — LAB (OUTPATIENT)
Dept: LAB | Facility: CLINIC | Age: 60
End: 2025-07-03
Payer: COMMERCIAL

## 2025-07-03 DIAGNOSIS — R59.0 LEFT CERVICAL LYMPHADENOPATHY: ICD-10-CM

## 2025-07-03 DIAGNOSIS — C88.40: ICD-10-CM

## 2025-07-03 DIAGNOSIS — D48.5 NEOPLASM OF UNCERTAIN BEHAVIOR OF SKIN: Primary | ICD-10-CM

## 2025-07-03 LAB
ALBUMIN SERPL BCG-MCNC: 4.5 G/DL (ref 3.5–5.2)
ALP SERPL-CCNC: 64 U/L (ref 40–150)
ALT SERPL W P-5'-P-CCNC: 15 U/L (ref 0–70)
ANION GAP SERPL CALCULATED.3IONS-SCNC: 10 MMOL/L (ref 7–15)
AST SERPL W P-5'-P-CCNC: 18 U/L (ref 0–45)
BASOPHILS # BLD AUTO: 0.1 10E3/UL (ref 0–0.2)
BASOPHILS NFR BLD AUTO: 1 %
BILIRUB SERPL-MCNC: 0.3 MG/DL
BUN SERPL-MCNC: 22.1 MG/DL (ref 8–23)
CALCIUM SERPL-MCNC: 9 MG/DL (ref 8.8–10.4)
CHLORIDE SERPL-SCNC: 103 MMOL/L (ref 98–107)
CREAT SERPL-MCNC: 0.94 MG/DL (ref 0.67–1.17)
EGFRCR SERPLBLD CKD-EPI 2021: >90 ML/MIN/1.73M2
EOSINOPHIL # BLD AUTO: 0.3 10E3/UL (ref 0–0.7)
EOSINOPHIL NFR BLD AUTO: 4 %
ERYTHROCYTE [DISTWIDTH] IN BLOOD BY AUTOMATED COUNT: 12.4 % (ref 10–15)
GLUCOSE SERPL-MCNC: 91 MG/DL (ref 70–99)
HCO3 SERPL-SCNC: 25 MMOL/L (ref 22–29)
HCT VFR BLD AUTO: 46.3 % (ref 40–53)
HGB BLD-MCNC: 15.6 G/DL (ref 13.3–17.7)
IMM GRANULOCYTES # BLD: 0 10E3/UL
IMM GRANULOCYTES NFR BLD: 0 %
LDH SERPL L TO P-CCNC: 172 U/L (ref 0–250)
LYMPHOCYTES # BLD AUTO: 2.1 10E3/UL (ref 0.8–5.3)
LYMPHOCYTES NFR BLD AUTO: 28 %
MCH RBC QN AUTO: 30.6 PG (ref 26.5–33)
MCHC RBC AUTO-ENTMCNC: 33.7 G/DL (ref 31.5–36.5)
MCV RBC AUTO: 91 FL (ref 78–100)
MONOCYTES # BLD AUTO: 0.6 10E3/UL (ref 0–1.3)
MONOCYTES NFR BLD AUTO: 8 %
NEUTROPHILS # BLD AUTO: 4.5 10E3/UL (ref 1.6–8.3)
NEUTROPHILS NFR BLD AUTO: 59 %
NRBC # BLD AUTO: 0 10E3/UL
NRBC BLD AUTO-RTO: 0 /100
PLATELET # BLD AUTO: 274 10E3/UL (ref 150–450)
POTASSIUM SERPL-SCNC: 4.6 MMOL/L (ref 3.4–5.3)
PROT SERPL-MCNC: 7.2 G/DL (ref 6.4–8.3)
RBC # BLD AUTO: 5.1 10E6/UL (ref 4.4–5.9)
RETICS # AUTO: 0.09 10E6/UL (ref 0.03–0.1)
RETICS/RBC NFR AUTO: 1.7 % (ref 0.5–2)
SODIUM SERPL-SCNC: 138 MMOL/L (ref 135–145)
WBC # BLD AUTO: 7.6 10E3/UL (ref 4–11)

## 2025-07-03 PROCEDURE — 88184 FLOWCYTOMETRY/ TC 1 MARKER: CPT | Performed by: PATHOLOGY

## 2025-07-03 PROCEDURE — 88341 IMHCHEM/IMCYTCHM EA ADD ANTB: CPT | Mod: TC | Performed by: DERMATOLOGY

## 2025-07-03 PROCEDURE — 88341 IMHCHEM/IMCYTCHM EA ADD ANTB: CPT | Mod: 26 | Performed by: DERMATOLOGY

## 2025-07-03 PROCEDURE — 88185 FLOWCYTOMETRY/TC ADD-ON: CPT | Performed by: DERMATOLOGY

## 2025-07-03 PROCEDURE — 88364 INSITU HYBRIDIZATION (FISH): CPT | Mod: 26 | Performed by: DERMATOLOGY

## 2025-07-03 PROCEDURE — 88305 TISSUE EXAM BY PATHOLOGIST: CPT | Mod: 26 | Performed by: DERMATOLOGY

## 2025-07-03 PROCEDURE — 88365 INSITU HYBRIDIZATION (FISH): CPT | Mod: 26 | Performed by: DERMATOLOGY

## 2025-07-03 PROCEDURE — 88188 FLOWCYTOMETRY/READ 9-15: CPT | Performed by: PATHOLOGY

## 2025-07-03 PROCEDURE — 99207 BLOOD MORPHOLOGY PATHOLOGIST REVIEW: CPT | Performed by: STUDENT IN AN ORGANIZED HEALTH CARE EDUCATION/TRAINING PROGRAM

## 2025-07-03 PROCEDURE — 88342 IMHCHEM/IMCYTCHM 1ST ANTB: CPT | Mod: 26 | Performed by: DERMATOLOGY

## 2025-07-03 RX ORDER — BETAMETHASONE DIPROPIONATE 0.5 MG/G
OINTMENT, AUGMENTED TOPICAL 2 TIMES DAILY
Qty: 50 G | Refills: 3 | Status: SHIPPED | OUTPATIENT
Start: 2025-07-03

## 2025-07-03 ASSESSMENT — PAIN SCALES - GENERAL: PAINLEVEL_OUTOF10: NO PAIN (0)

## 2025-07-03 NOTE — PATIENT INSTRUCTIONS
Wound Care After a Biopsy    What is a skin biopsy?  A skin biopsy allows the doctor to examine a very small piece of tissue under the microscope to determine the diagnosis and the best treatment for the skin condition. A local anesthetic (numbing medicine) is injected with a very small needle into the skin area to be tested. A small piece of skin is taken from the area. Sometimes a suture (stitch) is used.     What are the risks of a skin biopsy?  I will experience scar, bleeding, swelling, pain, crusting and redness. I may experience incomplete removal or recurrence. Risks of this procedure are excessive bleeding, bruising, infection, nerve damage, numbness, thick (hypertrophic or keloidal) scar and non-diagnostic biopsy.    How should I care for my wound for the first 24 hours?  Keep the wound dry and covered for 24 hours  If it bleeds, hold direct pressure on the area for 15 minutes. If bleeding does not stop, call us or go to the emergency room  Avoid strenuous exercise the first 1-2 days or as your doctor instructs you    How should I care for the wound after 24 hours?  After 24 hours, remove the bandage  You may bathe or shower as normal  If you had a scalp biopsy, you can shampoo as usual and can use shower water to clean the biopsy site daily  Clean the wound once a day with gentle soap and water  Do not scrub, be gentle  Apply white petroleum/Vaseline after cleaning the wound with a cotton swab or a clean finger, and keep the site covered with a Bandaid /bandage. Bandages are not necessary with a scalp biopsy  If you are unable to cover the site with a Bandaid /bandage, re-apply ointment 2-3 times a day to keep the site moist. Moisture will help with healing  Avoid strenuous activity for first 1-2 days  Avoid lakes, rivers, pools, and oceans until the stitches are removed or the site is healed    How do I clean my wound?  Wash hands thoroughly with soap or use hand  before all wound care  Clean  the wound with gentle soap and water  Apply white petroleum/Vaseline  to wound after it is clean  Replace the Bandaid /bandage to keep the wound covered for the first few days or as instructed by your doctor  If you had a scalp biopsy, warm shower water to the area on a daily basis should suffice    What should I use to clean my wound?   Cotton-tipped applicators (Qtips )  White petroleum jelly (Vaseline ). Use a clean new container and use Q-tips to apply.  Bandaids  as needed  Gentle soap     How should I care for my wound long term?  Do not get your wound dirty  Keep up with wound care for one week or until the area is healed.  If you have stitches, stitches need to be removed in 14 days. You may return to our clinic for this or you may have it done locally at your doctor s office.  A small scab will form and fall off by itself when the area is completely healed. The area will be red and will become pink in color as it heals. Sun protection is very important for how your scar will turn out. Sunscreen with an SPF 30 or greater is recommended once the area is healed.  You should have some soreness but it should be mild and slowly go away over several days. Talk to your doctor about using tylenol for pain,    When should I call my doctor?  If you have increased:   Pain or swelling  Pus or drainage (clear or slightly yellow drainage is ok)  Temperature over 100F  Spreading redness or warmth around wound    When will I hear about my results?  The biopsy results can take 2 weeks to come back.  Your results will automatically release to [employer] before your provider has even reviewed them.  The clinic will call you with the results, send you a [employer] message, or have you schedule a follow-up clinic or phone time to discuss the results.  Contact our clinics if you do not hear from us in 2 weeks.    Who should I call with questions?  [facility]: [phone]  [facility]  Psychiatric hospital: [phone]  For urgent needs outside of business hours call the [facility] at [phone] and ask for the dermatology resident on call

## 2025-07-03 NOTE — LETTER
7/3/2025       RE: Bob Miranda  [address] [place] [address]     Dear Colleague,    Thank you for referring your patient, Bob Miranda, to the [facility] DERMATOLOGY CLINIC [facility]. Please see a copy of my visit note below.    [facility] Dermatology Note    Encounter Date: Jul 3, 2025    Dermatology Problem List:  1. 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 Primary cutaneous marginal zone B cell lymphoma with new lesion overlying the L clavicle and palpable L lateral cervical lymph node. Will biopsy L clavicular lesion and US the lymph node but suspect the latter is reactive (had dental work 6 months ago on the L lower jaw). Pending biopsy results, will start topical augmented betamethasone ointment. No other recurrence in previously affected areas.   - Ordered labs today: CBC, CMP, Flow cytometry, smear, LDH  - Punch biopsy performed today. See procedure section.  - US head/neck ordered today.   - Start augmented betamethasone ointment, pending biopsy results    Procedures:  Punch biopsy, L clavicle:  After discussion of benefits and risks including but not limited to bleeding/bruising, pain/swelling, infection, scar, incomplete removal, nerve damage/numbness, recurrence, and non-diagnostic biopsy,  verbal consent and photographs were obtained. Time-out was performed. The area was cleaned with isopropyl alcohol. 0.5mL of 1% lidocaine with epinephrine was injected to obtain adequate anesthesia of the lesion. A 4 mm punch biopsy was performed.  4-0 prolene  sutures were utilized to approximate the epidermal edges.  White petroleum jelly/VaselineTM and a bandage was applied to the wound.  Explicit verbal and written wound care instructions were provided.  The patient left the Dermatology Clinic in good condition. The patient was counseled to follow up for suture removal in approximately 10-14 days.      Follow-up in 1 year, pending biopsy results.       Staff Involved:  Staff/Scribe  Scribe Disclosure:   I, Mackenzie Isaac, am serving as a scribe to document services personally performed by Colten Aguiar MD based on data collection and the provider's statements to me.     Provider Disclosure:   The documentation recorded by the scribe accurately reflects the services I personally performed and the decisions made by me.    Colten Aguiar MD   of Dermatology  Department of Dermatology  [facility]        CC:   Chief Complaint   Patient presents with     Derm Problem     CBCL-Skin is doing well. Area of concern on left collarbone.        History of Present Illness:  Mr. Bob Miranda is a 60 year old male who presents as a return patient.    Today, patient reports skin is overall doing well. Reports spot on the collarbone present for the last few months. Thought it was a bruise at first but it has never gone away. Denies fever, chills or weigh loss.     Labs:  04/2024 CBC, CMP, Flow cytometry, LDH reviewed    Physical exam:  Vitals: There were no vitals taken for this visit.  GEN: This is a well developed, well-nourished male in no acute distress, in a pleasant mood.    SKIN: Almendarez phototype III  - Focused examination of the face, neck, chest, abdomen, back, arms and hands was performed.  - Brightly erythematous thin plaque overlying the L clavicle  - Palpable lymph node in the L lateral cervical chain, non-fixed, non-firm.  - No other cervical supraclavicular or axillary lymphadenopathy  - No other lesions  of concern on areas examined.     Past Medical History:   No past medical history on file.  No past surgical history on file.    Social History:   reports that he has never smoked. He has never used smokeless tobacco.    Family History:  No family history on file.    Medications:  No current outpatient medications on file.     Allergies   Allergen Reactions     Povidone Iodine Rash                 Again, thank you for allowing me to participate in the care of your patient.      Sincerely,    Colten Aguiar MD

## 2025-07-03 NOTE — NURSING NOTE
Dermatology Rooming Note    Bob Miranda's goals for this visit include:   Chief Complaint   Patient presents with    Derm Problem     CBCL-Skin is doing well. Area of concern on left collarbone.      Savanah Fuentes RN    
Lidocaine-epinephrine 1-1:260345 % injection   1mL once for one use, starting 7/3/2025 ending 7/3/2025,  2mL disp, R-0, injection  Injected by Jannet CHAVEZ CMA      
Yes

## 2025-07-07 LAB
LOCATION OF TASK: NORMAL
PATH REPORT.COMMENTS IMP SPEC: NORMAL
PATH REPORT.FINAL DX SPEC: NORMAL
PATH REPORT.FINAL DX SPEC: NORMAL
PATH REPORT.MICROSCOPIC SPEC OTHER STN: NORMAL
PATH REPORT.RELEVANT HX SPEC: NORMAL
PATH REPORT.RELEVANT HX SPEC: NORMAL

## 2025-07-08 ENCOUNTER — ANCILLARY PROCEDURE (OUTPATIENT)
Dept: ULTRASOUND IMAGING | Facility: CLINIC | Age: 60
End: 2025-07-08
Attending: DERMATOLOGY
Payer: COMMERCIAL

## 2025-07-08 DIAGNOSIS — R59.0 LEFT CERVICAL LYMPHADENOPATHY: ICD-10-CM

## 2025-07-08 DIAGNOSIS — C88.40: ICD-10-CM

## 2025-07-08 PROCEDURE — 76536 US EXAM OF HEAD AND NECK: CPT | Mod: TC | Performed by: RADIOLOGY

## 2025-07-13 LAB
PATH REPORT.COMMENTS IMP SPEC: ABNORMAL
PATH REPORT.COMMENTS IMP SPEC: YES
PATH REPORT.FINAL DX SPEC: ABNORMAL
PATH REPORT.GROSS SPEC: ABNORMAL
PATH REPORT.MICROSCOPIC SPEC OTHER STN: ABNORMAL
PATH REPORT.RELEVANT HX SPEC: ABNORMAL

## 2025-07-14 ENCOUNTER — ALLIED HEALTH/NURSE VISIT (OUTPATIENT)
Dept: DERMATOLOGY | Facility: CLINIC | Age: 60
End: 2025-07-14
Payer: COMMERCIAL

## 2025-07-14 DIAGNOSIS — D48.5 NEOPLASM OF UNCERTAIN BEHAVIOR OF SKIN: Primary | ICD-10-CM

## 2025-07-14 ASSESSMENT — PAIN SCALES - GENERAL: PAINLEVEL_OUTOF10: NO PAIN (0)

## 2025-07-14 NOTE — PROGRESS NOTES
Bob Miranda comes into clinic today at the request of Dr. Aguiar Ordering Provider for Suture Removal:  Incision was dry, clean and intact, incision cleansed with wound cleanser and sutures were removed. Pt tolerated the procedure. PT had no additional concerns at this time. . The patient says the site(s) is(are) doing well and denies any bleeding, purulence, or excess pain/tenderness.      This service provided today was under the supervising provider of the day Dr. Eladio Abad, who was available if needed.    Nikhil Garcia -EMT